# Patient Record
Sex: FEMALE | Race: BLACK OR AFRICAN AMERICAN | URBAN - METROPOLITAN AREA
[De-identification: names, ages, dates, MRNs, and addresses within clinical notes are randomized per-mention and may not be internally consistent; named-entity substitution may affect disease eponyms.]

---

## 2017-01-17 ENCOUNTER — TELEPHONE (OUTPATIENT)
Dept: FAMILY MEDICINE CLINIC | Facility: CLINIC | Age: 42
End: 2017-01-17

## 2017-01-17 NOTE — TELEPHONE ENCOUNTER
REFILL request from [facility] phone # [phone],  Fax #  [phone]   drug name:  Norvasc Tab  10mg take 1 by mouth daily  Qty: 30 max refills  11

## 2017-01-17 NOTE — TELEPHONE ENCOUNTER
LMTCB, transfer to [facility] ext [phone]    Requesting Norvasc refill    Hypertensive Medications  Protocol Criteria:  · Appointment scheduled in the past 6 months or in the next 3 months  · BMP or CMP in the past 12 months  · Creatinine result < 2  Recent Vis

## 2017-01-18 RX ORDER — AMLODIPINE BESYLATE 10 MG/1
TABLET ORAL
Qty: 30 TABLET | Refills: 1 | Status: SHIPPED | OUTPATIENT
Start: 2017-01-18

## 2017-01-18 NOTE — TELEPHONE ENCOUNTER
Call transferred to RN from [address] [place] [address]. Pt asking for a Norvasc refill. Advised pt to schedule appt as she was last seen in office Aug 2014. Pt states she is not able to schedule appt at this time. Please advise, pt is aware doctor out of office at this time.

## 2017-01-18 NOTE — TELEPHONE ENCOUNTER
Prescription refilled; call patient.  Agree that patient REALLY needs to be seen before she has a serious vascular event!!!

## 2017-01-19 NOTE — TELEPHONE ENCOUNTER
Notified pt prescription was refilled and reviewed doctor's recommendations to schedule OV, pt states she will call back on Monday when she knows her schedule. Pt had no further questions at this time.